# Patient Record
(demographics unavailable — no encounter records)

---

## 2025-01-29 NOTE — REASON FOR VISIT
[Initial Evaluation] : an initial evaluation [Patient] : patient [Mother] : mother [FreeTextEntry1] : right  hip pain/thigh pain

## 2025-01-29 NOTE — REVIEW OF SYSTEMS
[Limping] : limping [Joint Pains] : arthralgias [Change in Activity] : no change in activity [Rash] : no rash [Heart Problems] : no heart problems [Congestion] : no congestion [Feeding Problem] : no feeding problem [Joint Swelling] : no joint swelling [Back Pain] : ~T no back pain

## 2025-01-29 NOTE — PHYSICAL EXAM
[FreeTextEntry1] : GAIT: significant limp noted favoring the right. Good coordination and balance GENERAL: alert, cooperative pleasant young 13 yo female in NAD SKIN: The skin is intact, warm, pink and dry over the area examined. EYES: Normal conjunctiva, normal eyelids and pupils were equal and round. ENT: normal ears, normal nose and normal lips. CARDIOVASCULAR: brisk capillary refill, but no peripheral edema. RESPIRATORY: The patient is in no apparent respiratory distress. They're taking full deep breaths without use of accessory muscles or evidence of audible wheezes or stridor without the use of a stethoscope. Normal respiratory effort. ABDOMEN: not examined   SPINE no tenderness to palpation. Full ROM.  HIP riight; No tenderness to palpation but area of pain with activity and walking is the right ischium.  Increase in tenderness with full flexion and Tightness of hamstring noted. Full hip abduction and IR/ER.  Neg impingement sign. no tenderness over greater trochanter. No tenderness over the iliac crests.  distal motor 5/5 sensation grossly intact brisk cap refill

## 2025-01-29 NOTE — ASSESSMENT
[FreeTextEntry1] : right hip pain/limp in active runner  The history for today's visit was obtained from the child, as well as the parent. The child's history was unreliable alone due to age and therefore, the parent was used today as an independent historian. Ap and frog pelvis including 1/2 of the femurs reveal no fracture or dislocation. The ischial apophysis is open on both sides. no fracture seen. No stress fracture seen . no periosteal reaction. No lesions of bone.  The clinical exam and xrays were reviewed. The limp is concerning at this time. MR of the hip right is needed to r/o stress reaction in the femoral neck vs apophysitis of the ischium right side. Mother instructed to contact Dr. Finnegan once the study is complete to review and discuss the plan. Hold on track/running until MRI done. No gym at school All questions answered. Parent in agreement with the plan. Angela DEY, GERALDINE, PAC, have acted as a scribe and documented the above for Dr. Finnegan.  The above documentation completed by the scribe is an accurate record of both my words and actions.  [employer]

## 2025-01-29 NOTE — HISTORY OF PRESENT ILLNESS
[Stable] : stable [FreeTextEntry1] : 14-year-old female presents with her mother and grandmother for evaluation of right hip and thigh pain which has been present for approximately 2 months.  She denies any specific injury that started the pain.  She is involved in track and feels including sprinting and hurdles but the pain had started prior to her starting track.  The pain is localized to the buttock region as well as radiating to the lateral aspect of the thigh.  Mother has noticed her limping.  She continues to participate despite the pain but is noted to limp worse after activity.  She has tried nonsteroidals stretching warmth and ice without significant relief.  She denies any fever or recent illness.  She denies any numbness or tingling.  She denies any back pain.

## 2025-01-29 NOTE — DATA REVIEWED
[de-identified] : Ap and frog pelvis including 1/2 of the femurs reveal no fracture or dislocation. The ischial apophysis is open on both sides. no fracture seen. No stress fracture seen . no periosteal reaction. No lesions of bone.